# Patient Record
(demographics unavailable — no encounter records)

---

## 2025-05-12 NOTE — RETURN TO WORK/SCHOOL
[Return Date: _____] : as of [unfilled].  This has been discussed in detail with ~Jocelyn~ and ~he/she~ understands this. [Full Duty] : full duty

## 2025-05-12 NOTE — DISCUSSION/SUMMARY
[de-identified] : 38 yo male with lumbar radiculopathy, recommend PT, NSAIDS, flexeril, RTO 6 weeks.   Diagnosis, prognosis, natural history and treatment was discussed with patient. Patient was advised if the following symptoms develop: chills, fever,  loss of bladder control, bowel incontinence or urinary retention, numbness/tingling or weakness is present in upper or lower extremities, to go to the nearest emergency room. This may be a new clinical condition not present at the time of the patient visit  that may lead to paralysis and/or death, Patient advised if the above symptoms developed to also call the office immediately to inform us and to go to the nearest emergency room.

## 2025-05-12 NOTE — HISTORY OF PRESENT ILLNESS
[Prolonged Sitting] : worsened by prolonged sitting [Walking] : worsened by walking [Heat] : relieved by heat [Rest] : relieved by rest [de-identified] : Pt presents with c/o low back pain since 04/07/2025. Pt denies recent injury/falls but states he sustained a fall 1 year ago, treated with PT. Pt went to urgent care on 04/12/2025 was prescribed flexeril, this provided great pain relief. Pain radiates to left buttock and posterior aspect of LLE to knee, associated with tingling. Pt denies numbness,or weakness on B/L LE. Pt take Aleve, this provides no pain relief  Pt denies having RIVKA in the past, does not participate with PT at this time Pt denies fever, chills, weight changes, loss of bladder control, bowel incontinence or urinary retention or saddle anesthesia The patient's past medical history, past surgical history, medications, allergies, and social history were reviewed by me today with the patient and documented accordingly. In addition, the patient's family history, which is noncontributory to this visit, was also reviewed.   [Ataxia] : no ataxia [Loss of Dexterity] : good dexterity [Urinary Ret.] : no urinary retention [de-identified] : increased activity exacerbates pain

## 2025-05-12 NOTE — PHYSICAL EXAM
[UE/LE] : Sensory: Intact in bilateral upper & lower extremities [ALL] : dorsalis pedis, posterior tibial, femoral, popliteal, and radial 2+ and symmetric bilaterally [Normal] : Oriented to person, place, and time, insight and judgement were intact and the affect was normal [Jiménez's Sign] : negative Jiménez's sign [Pronator Drift] : negative pronator drift [SLR] : negative straight leg raise [de-identified] : Lumbar ROM: Limited, painful TTP L spine and left paraspinals L region.  Skin intact L spine. No rashes, ulcers, blisters.  No lymphedema.  Rapid alternating movements- Intact.  Full and non-painful ROM RUE, LUE, RLE, and LLE. Skin intact RUE, LUE, RLE, and LLE. No rashes, blisters, ulcers. NTTP RUE, LUE, RLE, and LLE. No evidence of dislocation or subluxation B/L. [de-identified] : 3 views AP and Lat Flex/ext of LS Spine from [id number] 05/12/25 . Read and dictated by Dr. Elton Mijares Board Certified orthopaedic surgeon [id number]